# Patient Record
Sex: FEMALE | Race: WHITE | NOT HISPANIC OR LATINO | URBAN - METROPOLITAN AREA
[De-identification: names, ages, dates, MRNs, and addresses within clinical notes are randomized per-mention and may not be internally consistent; named-entity substitution may affect disease eponyms.]

---

## 2022-03-15 ENCOUNTER — ESTABLISHED PATIENT (OUTPATIENT)
Dept: URBAN - METROPOLITAN AREA CLINIC 21 | Facility: CLINIC | Age: 77
End: 2022-03-15

## 2022-03-15 DIAGNOSIS — H16.223: ICD-10-CM

## 2022-03-15 PROCEDURE — 68761 CLOSE TEAR DUCT OPENING: CPT

## 2022-03-15 PROCEDURE — 92012 INTRM OPH EXAM EST PATIENT: CPT

## 2022-03-15 ASSESSMENT — VISUAL ACUITY
OD_SC: 20/30-1
OS_SC: 20/30-2

## 2022-03-15 ASSESSMENT — TONOMETRY
OS_IOP_MMHG: 14
OD_IOP_MMHG: 14

## 2022-03-15 NOTE — PROCEDURE NOTE: CLINICAL
PROCEDURE NOTE: Punctal Plugs, Extended #2 Bilateral Lower Lids. Diagnosis: Keratoconjunctivitis Sicca, Not Specified As Sjögren's. Prior to treatment, the risks/benefits/alternatives were discussed. The patient wished to proceed with procedure. Patient tolerated procedure well. There were no complications. Post procedure instructions given. Jon Thayer

## 2022-11-23 ENCOUNTER — ESTABLISHED PATIENT (OUTPATIENT)
Dept: URBAN - METROPOLITAN AREA CLINIC 21 | Facility: CLINIC | Age: 77
End: 2022-11-23

## 2022-11-23 DIAGNOSIS — H43.813: ICD-10-CM

## 2022-11-23 DIAGNOSIS — Z96.1: ICD-10-CM

## 2022-11-23 DIAGNOSIS — H16.223: ICD-10-CM

## 2022-11-23 PROCEDURE — 99214 OFFICE O/P EST MOD 30 MIN: CPT

## 2022-11-23 ASSESSMENT — VISUAL ACUITY
OS_SC: 20/30+3
OD_SC: 20/25-1
OU_SC: 20/25+2
OU_SC: J1+

## 2022-11-23 ASSESSMENT — KERATOMETRY
OD_K2POWER_DIOPTERS: 44.75
OD_K1POWER_DIOPTERS: 44.00
OS_K2POWER_DIOPTERS: 44.25
OS_AXISANGLE_DEGREES: 176
OS_K1POWER_DIOPTERS: 44.00
OD_AXISANGLE_DEGREES: 2
OS_AXISANGLE2_DEGREES: 86
OD_AXISANGLE2_DEGREES: 92

## 2022-11-23 ASSESSMENT — TONOMETRY
OD_IOP_MMHG: 14
OS_IOP_MMHG: 14

## 2024-02-29 ENCOUNTER — ESTABLISHED PATIENT (OUTPATIENT)
Dept: URBAN - METROPOLITAN AREA CLINIC 20 | Facility: CLINIC | Age: 79
End: 2024-02-29

## 2024-02-29 DIAGNOSIS — H16.223: ICD-10-CM

## 2024-02-29 PROCEDURE — 92014 COMPRE OPH EXAM EST PT 1/>: CPT

## 2024-02-29 ASSESSMENT — KERATOMETRY
OS_AXISANGLE_DEGREES: 176
OD_AXISANGLE2_DEGREES: 92
OD_K1POWER_DIOPTERS: 44.00
OD_AXISANGLE_DEGREES: 2
OS_K2POWER_DIOPTERS: 44.25
OS_AXISANGLE2_DEGREES: 86
OS_K1POWER_DIOPTERS: 44.00
OD_K2POWER_DIOPTERS: 44.75

## 2024-02-29 ASSESSMENT — TONOMETRY
OD_IOP_MMHG: 10
OS_IOP_MMHG: 9

## 2024-02-29 ASSESSMENT — VISUAL ACUITY
OS_SC: 20/30
OD_SC: 20/25-1

## 2025-04-15 ENCOUNTER — FOLLOW UP (OUTPATIENT)
Age: 80
End: 2025-04-15

## 2025-04-15 DIAGNOSIS — H16.223: ICD-10-CM

## 2025-04-15 DIAGNOSIS — H35.363: ICD-10-CM

## 2025-04-15 PROCEDURE — 99214 OFFICE O/P EST MOD 30 MIN: CPT
